# Patient Record
Sex: FEMALE | Race: WHITE
[De-identification: names, ages, dates, MRNs, and addresses within clinical notes are randomized per-mention and may not be internally consistent; named-entity substitution may affect disease eponyms.]

---

## 2017-05-24 ENCOUNTER — HOSPITAL ENCOUNTER (OUTPATIENT)
Dept: HOSPITAL 74 - JASU-SURG | Age: 67
Discharge: HOME | End: 2017-05-24
Attending: ORTHOPAEDIC SURGERY
Payer: COMMERCIAL

## 2017-05-24 VITALS — DIASTOLIC BLOOD PRESSURE: 62 MMHG | HEART RATE: 72 BPM | TEMPERATURE: 97.5 F | SYSTOLIC BLOOD PRESSURE: 114 MMHG

## 2017-05-24 VITALS — BODY MASS INDEX: 25.6 KG/M2

## 2017-05-24 DIAGNOSIS — M75.41: ICD-10-CM

## 2017-05-24 DIAGNOSIS — D17.39: ICD-10-CM

## 2017-05-24 DIAGNOSIS — M19.011: ICD-10-CM

## 2017-05-24 DIAGNOSIS — M75.101: Primary | ICD-10-CM

## 2017-05-24 PROCEDURE — 0LQ10ZZ REPAIR RIGHT SHOULDER TENDON, OPEN APPROACH: ICD-10-PCS | Performed by: ORTHOPAEDIC SURGERY

## 2017-05-24 PROCEDURE — 0PB94ZZ EXCISION OF RIGHT CLAVICLE, PERCUTANEOUS ENDOSCOPIC APPROACH: ICD-10-PCS | Performed by: ORTHOPAEDIC SURGERY

## 2017-05-24 PROCEDURE — 0RBJ4ZZ EXCISION OF RIGHT SHOULDER JOINT, PERCUTANEOUS ENDOSCOPIC APPROACH: ICD-10-PCS | Performed by: ORTHOPAEDIC SURGERY

## 2017-05-24 PROCEDURE — 0JBD0ZZ EXCISION OF RIGHT UPPER ARM SUBCUTANEOUS TISSUE AND FASCIA, OPEN APPROACH: ICD-10-PCS | Performed by: ORTHOPAEDIC SURGERY

## 2017-05-24 NOTE — HP
Satellite [facility]





- Chief Complaint


Chief Complaint: right shoulder pain, weakness, poor ROM


History of Present Illness: over 1 year s/p fall, right RTC tear


History Source: Patient


Limitations to Obtaining History: No Limitations





- Past Medical History


Allergies/Adverse Reactions: 


 Allergies











Allergy/AdvReac Type Severity Reaction Status Date / Time


 


No Known Drug Allergies Allergy   Verified 05/23/17 17:34














- Current Medications


Current Medications: 


 Home Medications











 Medication  Instructions  Recorded


 


Aspirin Coated [Ecotrin -] 81 mg PO DAILY 05/23/17


 


Atorvastatin Ca [Lipitor] 20 mg PO HS 05/23/17


 


Levothyroxine [Synthroid -] 25 mcg PO DAILY 05/23/17


 


Meloxicam [Mobic] 15 mg PO DAILY 05/23/17


 


Nortriptyline HCl [Pamelor -] 25 mg PO HS 05/23/17


 


Omeprazole 20 mg PO DAILY 05/23/17














Satellite Physical Exam





- Physical Examination


Vital Signs: 


 Vital Signs











 Period  Temp  Pulse  Resp  BP Sys/Islas  Pulse Ox


 


 Last 24 Hr  97.9 F-97.9 F  70-70  16-16  129-129/77-77  99











General Appearance: Well Nourished


ENT: Clear


Lung: Clear to auscultation


Heart: Regular rate & rhythm


Breasts: Soft


Abdomen: Soft


Extremities: No edema





Satellite Impression/Plan





- Impression/Plan


Impression: right shoulder RTC tear, impingement, possible labral tear


Operative Procedure: right shoulder arthroscopy, decompression, possible RTC and

/or labral repair


Date to be Performed: 05/24/17

## 2017-05-24 NOTE — OP
Operative Note





- Note:


Operative Date: 05/24/17 ([facility])


Pre-Operative Diagnosis: right shoulder rct, impingement


Operation: right shoulder arthroscopy with SAD, DCE, mini-open RCR, lipoma 

excision


Implants: 2 arthrex swivelocks


Post-Operative Diagnosis: Same as Pre-op


Surgeon: Helder Coleman


Assistant: Gonzalez Elizalde


Anesthesiologist/CRNA: Jaquan Thrasher


Anesthesia: General, Local


Specimens Removed: lipoma, shavings


Estimated Blood Loss (mls): 10


Operative Report Dictated: Yes

## 2017-05-25 NOTE — PATH
Surgical Pathology Report



Patient Name:  MALLIKA ROSALES

Accession #:  [id number]

[facility]. Rec. #:  [id number]                                                        

   /Age/Gender:  1950 (Age: 67) / F

Account:  [id number]                                                          

             Location: [facility] SURGICAL

Taken:  2017

Received:  2017

Reported:  2017

Physicians:  Helder Coleman M.D.

  



Specimen(s) Received

 SHAVINGS 





Clinical History

Right shoulder rotator cuff tear







Final Diagnosis

SOFT TISSUE, RIGHT SHOULDER, ARTHROSCOPIC SHAVINGS:

SYNOVIUM AND FIBROCARTILAGE WITH MYXOHYALINE DEGENERATION.

FRAGMENTS OF UNREMARKABLE BONE AND SKELETAL MUSCLE.





***Electronically Signed***

Alexander Finkelstein, M.D.





Gross Description

Received in formalin, labeled "right shoulder shavings" is a 3.5 x 3.2 x 0.4 cm

aggregate of tan-yellow soft tissue fragments. A representative portion is

submitted in one cassette.

/2017

## 2017-07-28 ENCOUNTER — HOSPITAL ENCOUNTER (OUTPATIENT)
Dept: HOSPITAL 74 - JASU-ENDO | Age: 67
Discharge: HOME | End: 2017-07-28
Attending: INTERNAL MEDICINE
Payer: COMMERCIAL

## 2017-07-28 VITALS — TEMPERATURE: 98 F

## 2017-07-28 VITALS — DIASTOLIC BLOOD PRESSURE: 82 MMHG | SYSTOLIC BLOOD PRESSURE: 118 MMHG | HEART RATE: 63 BPM

## 2017-07-28 VITALS — BODY MASS INDEX: 26.5 KG/M2

## 2017-07-28 DIAGNOSIS — K55.20: ICD-10-CM

## 2017-07-28 DIAGNOSIS — D12.3: ICD-10-CM

## 2017-07-28 DIAGNOSIS — Z12.11: Primary | ICD-10-CM

## 2017-07-28 DIAGNOSIS — D12.2: ICD-10-CM

## 2017-07-28 DIAGNOSIS — D17.5: ICD-10-CM

## 2017-07-28 DIAGNOSIS — K64.8: ICD-10-CM

## 2017-07-28 PROCEDURE — 0DBF8ZX EXCISION OF RIGHT LARGE INTESTINE, VIA NATURAL OR ARTIFICIAL OPENING ENDOSCOPIC, DIAGNOSTIC: ICD-10-PCS | Performed by: INTERNAL MEDICINE

## 2017-07-28 PROCEDURE — 0DBK8ZX EXCISION OF ASCENDING COLON, VIA NATURAL OR ARTIFICIAL OPENING ENDOSCOPIC, DIAGNOSTIC: ICD-10-PCS | Performed by: INTERNAL MEDICINE

## 2017-07-28 PROCEDURE — 0DBL8ZX EXCISION OF TRANSVERSE COLON, VIA NATURAL OR ARTIFICIAL OPENING ENDOSCOPIC, DIAGNOSTIC: ICD-10-PCS | Performed by: INTERNAL MEDICINE

## 2017-07-31 NOTE — PATH
Surgical Pathology Report



Patient Name:  MALLIKA ROSALES

Accession #:  [id number]

[facility]. Rec. #:  [id number]                                                        

   /Age/Gender:  1950 (Age: 67) / F

Account:  [id number]                                                          

             Location: [facility]-ENDOSCOPY

Taken:  2017

Received:  2017

Reported:  2017

Physicians:  Cesar Beatty M.D.

  



Specimen(s) Received

A: BX DISTA TRANSVERSE COLON POLYP 

B: BX  PROXIMAL TRANSVERSE COLON POLYP 

C: BX RIGHT COLON POLYP 

D: BX COLON LIPOMA 





Clinical History

Screening

AVM; colon polyps; right colon lipoma; diverticulosis







Final Diagnosis

A. COLON, DISTAL TRANSVERSE, POLYP, POLYPECTOMY:  

ADENOMATOUS POLYP AND ADDITIONAL FRAGMENTS OF COLONIC MUCOSA WITH FOCAL SURFACE

HYPERPLASTIC CHANGE.



B.COLON, PROXIMAL TRANSVERSE, POLYP, BIOPSY:  

TUBULAR ADENOMA.



C. COLON, RIGHT, POLYP, BIOPSY:  

TUBULAR ADENOMA.

PROMINENT SUBMUCOSAL ADIPOSE TISSUE (SEE COMMENT).



D. COLON, RIGHT, LIPOMA, BIOPSY:  

BENIGN COLONIC MUCOSA WITH PROMINENT SUBMUCOSAL ADIPOSE TISSUE (SEE COMMENT).



Comment: The findings are compatible with lipoma in proper endoscopic settings.





***Electronically Signed***

Alexander Finkelstein, M.D.





Gross Description

A.  Received in formalin, labeled "cold snared distal transverse polyp" are 2

tan, irregular portions of soft tissue measuring 0.3-0.6 cm in greatest

dimension. The specimens are submitted in toto in one cassette.



B.  Received in formalin, labeled "biopsy proximal transverse polyp" is a tan,

irregular portion of soft tissue measuring 0.3 cm in greatest dimension. The

specimen is submitted in toto in one cassette.



C.  Received in formalin, labeled "biopsy right colon polyp" are 2 tan,

irregular portions of soft tissue measuring 0.3 cm in greatest dimension. The

specimens are submitted in toto in one cassette.



D.  Received in formalin, labeled "biopsy right lipoma" are 2 tan, irregular

portions of soft tissue measuring 0.3-0.4 cm in greatest dimension. The

specimens are submitted in toto in one cassette.

[facility]/2017



[facility]/2017

## 2018-10-14 ENCOUNTER — HOSPITAL ENCOUNTER (EMERGENCY)
Dept: HOSPITAL 74 - JERFT | Age: 68
Discharge: HOME | End: 2018-10-14
Payer: MEDICARE

## 2018-10-14 VITALS — TEMPERATURE: 98.9 F | DIASTOLIC BLOOD PRESSURE: 69 MMHG | HEART RATE: 83 BPM | SYSTOLIC BLOOD PRESSURE: 134 MMHG

## 2018-10-14 VITALS — BODY MASS INDEX: 25.8 KG/M2
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
## 2018-10-14 DIAGNOSIS — I10: ICD-10-CM

## 2018-10-14 DIAGNOSIS — R51: ICD-10-CM

## 2018-10-14 DIAGNOSIS — E78.5: ICD-10-CM
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
## 2018-10-14 DIAGNOSIS — E03.9: ICD-10-CM

## 2018-10-14 DIAGNOSIS — S93.601A: Primary | ICD-10-CM

## 2018-10-14 NOTE — PDOC
History of Present Illness





- General


Chief Complaint: Injury


Stated Complaint: LEG AND KNEE PAIN DO TO A FALL


Time Seen by Provider: 10/14/18 10:18





- History of Present Illness


Initial Comments: 





10/14/18 10:42


CHIEF COMPLAINT: fall





HISTORY OF PRESENT ILLNESS: 67 yo F with hx of HTN and HLD presents to [facility] 

track s/p fall yesterday.  Patient states she was walking down stairs and 

twisted her right foot/ankle and landed on her knees and hit her head on the 

right side against the wall.  Patient states she did not hit her head on the 

ground and denies any vomiting or LOC, but reports a slight headache last 

night.  Patient denies taking any anticoagulants.  





No recent travel or sick contacts. 





PAST MEDICAL HISTORY: Denies past medical history





FAMILY HISTORY: Denies





SOCIAL HISTORY: Denies tobacco, alcohol, illicit drug use. 





SURGICAL HISTORY: Denies





ALLERGIES: No known drug allergies





REVIEW OF SYSTEMS


General/Constitutional: Denies fever or chills. Denies weakness, weight change.





HEENT: Denies change in vision. Denies ear pain or discharge. Denies sore 

throat.





Cardiovascular: Denies chest pain or shortness of breath.





Respiratory: Denies cough, wheezing, or hemoptysis.





Gastrointestinal: Denies nausea, vomiting, diarrhea or constipation. Denies 

rectal bleeding.





Genitourinary: Denies dysuria, frequency, or change in urination.





Musculoskeletal: Right foot/ankle pain and right knee pain.  Denies neck or 

back pain.





Skin: Abrasion to left shin, no bleeding. 





Neurologic: Headache last night.  Denies vertigo, loss of consciousness, or 

loss of sensation.





Psychiatric: Denies depression or anxiety.








PHYSICAL EXAM


General Appearance: Well-appearing, appropriately dressed.  No apparent 

distress.





HEENT: EOMI, PERRLA, normal ENT inspection, normal voice, TMs normal, pharynx 

normal.  No conjunctival pallor.  No photophobia, scleral icterus.





Neck: Supple.  Trachea midline. No tenderness, rigidity, carotid bruit, stridor

, lymphadenopathy, or thyromegaly. 





Respiratory/Chest: Lungs CTAB.  No shortness of breath, chest tenderness, 

respiratory distress, accessory muscle use. No crackles, rales, rhonchi, stridor

, wheezing, dullness





Cardiovascular: RRR. S1, S2.  No JVD, murmur, bradycardia, tachycardia.





Vascular Pulses: Dorsalis-Pedis (R): 2+, Dorsalis-Pedis (L): 2+





Gastrointestinal/Abdominal: Normal bowel sounds.  Abdomen soft, non-distended.  

No tenderness or rebound tenderness. No  organomegaly, pulsatile mass, guarding

, hernia, hepatomegaly, splenomegaly.





Lymphatic: No adenopathy, tenderness.





Musculoskeletal/Extremities: Superficial abrasion to left shin, no bleeding.  

Tenderness and mild ecchymosis to R medial malleolus and over dorsum of right 

foot. Normal inspection. FROM of all extremities, normal capillary refill.  

Pelvis Stable.  No CVA tenderness. No tenderness to extremities, pedal edema, 

swelling, erythema or deformity.





Integumentary: Appropriate color, dry, warm.  No cyanosis, erythema, jaundice 

or rash





Neurologic: CNs II-XII intact. Fully oriented, alert.  Appropriate mood/affect. 

Motor strength 5/5.  No appreciable EOM palsy, facial droop or sensory deficit.


A&Ox3, follow commands, respond appropriately


CN2-12: conjugate gaze, pupil round, equal and reactive to light.  Visual


field full to confrontation. EOMI without nystagmus, pursuit is smooth without 

saccade. 


Facial sensation and muscle activation intact bilaterally.  Hearing intact 

bilaterally.


Palate elevate symmetrically.  Shoulder shrug and neck turn


full strength.  Tongue protrude midline.





Motor: UE and LE strength 5/5 throughout bilaterally. 


Sensory: 


  pin prick & temp : BUE & BLE intact and equal bilaterally


  Vibration & propioception: intact bilaterally at 1st MCP and MTP joints.


  no sensory level noted on trunk





Cerebellar: 


  Rapid-alternating movement with regular rhythm without bradykinesia.


  Finger-to-nose and heel-to-shin intact bilaterally without dysmetria or 

overshoot.


  Gait narrow based.  No shuffling.  Full hip flexion and knee flexion.


  Negative Romberg





No involuntary movement noted.  


No pronator drift.  No clonus. 





Past History





- Past Medical History


Allergies/Adverse Reactions: 


 Allergies











Allergy/AdvReac Type Severity Reaction Status Date / Time


 


No Known Drug Allergies Allergy   Verified 10/14/18 10:16











Home Medications: 


Ambulatory Orders





Aspirin Coated [Ecotrin -] 81 mg PO DAILY 05/23/17 


Atorvastatin Ca [Lipitor] 20 mg PO HS 05/23/17 


Levothyroxine [Synthroid -] 25 mcg PO DAILY 05/23/17 


Nortriptyline HCl [Pamelor -] 25 mg PO HS 05/23/17 


Omeprazole 20 mg PO DAILY 05/23/17 


Meloxicam 7.5 mg PO DAILY #7 tablet 10/14/18 








Anemia: No


Asthma: No


Cancer: No


Cardiac Disorders: No


CVA: No


COPD: No


CHF: No


Dementia: No


Diabetes: No


GI Disorders: No


 Disorders: No


HTN: No


Hypercholesterolemia: Yes


Liver Disease: No


Seizures: No


Thyroid Disease: Yes (HYPOTHYROIDISM)





- Surgical History


Abdominal Surgery: No


Appendectomy: No


Cardiac Surgery: No


Cholecystectomy: No


Lung Surgery: No


Neurologic Surgery: No


Orthopedic Surgery: No (RIGHT SHOULDER MUSCLE TEAR SURGERY)





- Suicide/Smoking/Psychosocial Hx


Smoking History: Never smoked


Have you smoked in the past 12 months: No


Hx Alcohol Use: No


Drug/Substance Use Hx: No


Substance Use Type: None


Hx Substance Use Treatment: No





*Physical Exam





- Vital Signs


 Last Vital Signs











Temp Pulse Resp BP Pulse Ox


 


 98.9 F   83   18   134/69   99 


 


 10/14/18 10:12  10/14/18 10:12  10/14/18 10:12  10/14/18 10:12  10/14/18 10:12














ED Treatment Course





- RADIOLOGY


Radiology Studies Ordered: 














 Category Date Time Status


 


 HEAD CT WITHOUT CONTRAST [CT] Stat CT Scan  10/14/18 10:41 Ordered


 


 ANKLE & FOOT-RIGHT* [RAD] Stat Radiology  10/14/18 10:41 Ordered


 


 KNEE 3 POS-RIGHT [RAD] Stat Radiology  10/14/18 10:41 Ordered














Medical Decision Making





- Medical Decision Making





10/14/18 11:51


 67 yo F with hx of HTN and HLD presents to fast track s/p fall yesterday.





-Head CT, knee/ft/ankle x-ray





Patient is well appearing, neurological intact with no focal deficits, is at 

baseline per .  





Imaging all negative.  Advised patient to f/u with neurology by the end of the 

week for further evaluation and of signs of return to ER.  Patient and  

verbalized understanding and agree to plan. 








*DC/Admit/Observation/Transfer


Diagnosis at time of Disposition: 


Fall


Qualifiers:


 Encounter type: initial encounter Qualified Code(s): W19.XXXA - Unspecified 

fall, initial encounter





Right foot sprain


Qualifiers:


 Encounter type: initial encounter Qualified Code(s): S93.601A - Unspecified 

sprain of right foot, initial encounter








- Discharge Dispostion


Disposition: HOME


Condition at time of disposition: Stable


Decision to Admit order: No





- Prescriptions


Prescriptions: 


Meloxicam 7.5 mg PO DAILY #7 tablet





- Referrals


Referrals: 


Willard Kaur MD [Primary Care Provider] - 


Amando Vargas MD [Staff Physician] - 





- Patient Instructions


Printed Discharge Instructions:  How to Prevent Falls


Additional Instructions: 


Please follow up with neurology by the end of this week for further evaluation.

  





Take medications as prescribed.





If you develop loss of memory, vomiting, change in vision, difficulty speaking 

or swallowing, or your family members notice a change in behavior, please 

return to the ER immediately. 





- Post Discharge Activity

## 2020-11-16 ENCOUNTER — HOSPITAL ENCOUNTER (OUTPATIENT)
Dept: HOSPITAL 74 - JASU-ENDO | Age: 70
Discharge: HOME | End: 2020-11-16
Attending: INTERNAL MEDICINE
Payer: MEDICARE

## 2020-11-16 VITALS — HEART RATE: 66 BPM | SYSTOLIC BLOOD PRESSURE: 121 MMHG | DIASTOLIC BLOOD PRESSURE: 55 MMHG

## 2020-11-16 VITALS — TEMPERATURE: 98.2 F

## 2020-11-16 VITALS — BODY MASS INDEX: 25.1 KG/M2

## 2020-11-16 DIAGNOSIS — Z86.010: ICD-10-CM

## 2020-11-16 DIAGNOSIS — D12.5: ICD-10-CM

## 2020-11-16 DIAGNOSIS — K57.30: ICD-10-CM

## 2020-11-16 DIAGNOSIS — K64.8: ICD-10-CM

## 2020-11-16 DIAGNOSIS — Z12.11: Primary | ICD-10-CM

## 2020-11-16 PROCEDURE — 0DBN8ZX EXCISION OF SIGMOID COLON, VIA NATURAL OR ARTIFICIAL OPENING ENDOSCOPIC, DIAGNOSTIC: ICD-10-PCS | Performed by: INTERNAL MEDICINE
